# Patient Record
Sex: FEMALE | Race: WHITE | Employment: FULL TIME | URBAN - METROPOLITAN AREA
[De-identification: names, ages, dates, MRNs, and addresses within clinical notes are randomized per-mention and may not be internally consistent; named-entity substitution may affect disease eponyms.]

---

## 2017-02-07 ENCOUNTER — TELEPHONE (OUTPATIENT)
Dept: FAMILY MEDICINE | Facility: CLINIC | Age: 62
End: 2017-02-07

## 2017-11-20 ENCOUNTER — HOSPITAL ENCOUNTER (OUTPATIENT)
Dept: MAMMOGRAPHY | Facility: CLINIC | Age: 62
Discharge: HOME | End: 2017-11-20
Attending: OBSTETRICS & GYNECOLOGY | Admitting: OBSTETRICS & GYNECOLOGY
Payer: COMMERCIAL

## 2017-11-20 DIAGNOSIS — Z12.31 VISIT FOR SCREENING MAMMOGRAM: ICD-10-CM

## 2017-11-20 PROCEDURE — 77063 BREAST TOMOSYNTHESIS BI: CPT

## 2017-11-20 PROCEDURE — G0202 SCR MAMMO BI INCL CAD: HCPCS

## 2017-12-30 ENCOUNTER — OFFICE VISIT (OUTPATIENT)
Dept: URGENT CARE | Facility: URGENT CARE | Age: 62
End: 2017-12-30
Payer: COMMERCIAL

## 2017-12-30 VITALS
TEMPERATURE: 98.9 F | OXYGEN SATURATION: 98 % | SYSTOLIC BLOOD PRESSURE: 102 MMHG | HEART RATE: 69 BPM | WEIGHT: 145.6 LBS | BODY MASS INDEX: 23.15 KG/M2 | DIASTOLIC BLOOD PRESSURE: 80 MMHG

## 2017-12-30 DIAGNOSIS — J01.00 ACUTE NON-RECURRENT MAXILLARY SINUSITIS: Primary | ICD-10-CM

## 2017-12-30 LAB
DEPRECATED S PYO AG THROAT QL EIA: NORMAL
SPECIMEN SOURCE: NORMAL

## 2017-12-30 PROCEDURE — 99213 OFFICE O/P EST LOW 20 MIN: CPT | Performed by: STUDENT IN AN ORGANIZED HEALTH CARE EDUCATION/TRAINING PROGRAM

## 2017-12-30 PROCEDURE — 87880 STREP A ASSAY W/OPTIC: CPT | Performed by: FAMILY MEDICINE

## 2017-12-30 PROCEDURE — 87081 CULTURE SCREEN ONLY: CPT | Performed by: STUDENT IN AN ORGANIZED HEALTH CARE EDUCATION/TRAINING PROGRAM

## 2017-12-30 NOTE — MR AVS SNAPSHOT
After Visit Summary   12/30/2017    Ria Miranda    MRN: [id number]           Patient Information     Date Of Birth          1955        Visit Information        Provider Department      12/30/2017 2:10 PM Sam Carter PA-C [facility] [place]        Today's Diagnoses     Acute non-recurrent maxillary sinusitis    -  1       Follow-ups after your visit        Follow-up notes from your care team     Return for Recheck If symptoms worsen or fail to improve..      Who to contact     If you have questions or need follow up information about today's clinic visit or your schedule please contact [facility] directly at [phone].  Normal or non-critical lab and imaging results will be communicated to you by MyChart, letter or phone within 4 business days after the clinic has received the results. If you do not hear from us within 7 days, please contact the clinic through [employer]oc[employer] or phone. If you have a critical or abnormal lab result, we will notify you by phone as soon as possible.  Submit refill requests through [employer] or call your pharmacy and they will forward the refill request to us. Please allow 3 business days for your refill to be completed.          Additional Information About Your Visit        MyChart Information     [employer] gives you secure access to your electronic health record. If you see a primary care provider, you can also send messages to your care team and make appointments. If you have questions, please call your primary care clinic.  If you do not have a primary care provider, please call [phone] and they will assist you.        Care EveryWhere ID     This is your Care EveryWhere ID. This could be used by other organizations to access your [facility] medical records  [id number]        Your Vitals Were     Pulse Temperature Last Period Pulse Oximetry BMI (Body Mass Index)       69 98.9  F (37.2  C) (Oral) 09/09/2005 98% 23.15 kg/m2         Blood Pressure from Last 3 Encounters:   12/30/17 102/80   06/24/16 110/70   03/07/16 100/70    Weight from Last 3 Encounters:   12/30/17 145 lb 9.6 oz (66 kg)   06/24/16 148 lb (67.1 kg)   03/07/16 147 lb 2 oz (66.7 kg)              We Performed the Following     Beta strep group A culture     Rapid strep screen          Today's Medication Changes          These changes are accurate as of: 12/30/17  3:52 PM.  If you have any questions, ask your nurse or doctor.               Start taking these medicines.        Dose/Directions    amoxicillin-clavulanate 875-125 MG per tablet   Commonly known as:  AUGMENTIN   Used for:  Acute non-recurrent maxillary sinusitis   Started by:  Sam Carter PA-C        Dose:  1 tablet   Take 1 tablet by mouth 2 times daily   Quantity:  20 tablet   Refills:  0            Where to get your medicines      These medications were sent to [facility] [address] [place] [address] [address] [facility]  [address] [facility] [address]     Phone:  [phone]     amoxicillin-clavulanate 875-125 MG per tablet                Primary Care Provider Office Phone # Fax #    Emelia Hairston [phone]       [facility] [address] [facility] [address]  [facility] [address]        Equal Access to Services     [facility] AH: Hadii aad ku hadasho Soomaali, waaxda luqadaha, qaybta kaalmada adeegyada, waxnina costa hayashok ybarra. So [facility] [phone].    ATENCIÓN: Si habla español, tiene a brown disposición servicios gratuitos de asistencia lingüística. Alvaame al [phone].    We comply with applicable federal civil rights laws and [place] laws. We do not discriminate on the basis of race, color, national origin, age, disability, sex, sexual orientation, or gender identity.            Thank you!     Thank you for choosing [facility]  for your care. Our goal is always to provide you with excellent care. Hearing back from our patients is  one way we can continue to improve our services. Please take a few minutes to complete the written survey that you may receive in the mail after your visit with us. Thank you!             Your Updated Medication List - Protect others around you: Learn how to safely use, store and throw away your medicines at www.disposemymeds.org.          This list is accurate as of: 12/30/17  3:52 PM.  Always use your most recent med list.                   Brand Name Dispense Instructions for use Diagnosis    amoxicillin-clavulanate 875-125 MG per tablet    AUGMENTIN    20 tablet    Take 1 tablet by mouth 2 times daily    Acute non-recurrent maxillary sinusitis       CALCIUM 600 + D 600-200 MG-UNIT Tabs     3 MONTHS         fluticasone 50 MCG/ACT spray    FLONASE    3 Bottle    USE TWO SPRAY(S) IN EACH NOSTRIL ONCE DAILY    Chronic rhinitis       metoprolol 25 MG 24 hr tablet    TOPROL-XL    45 tablet    Take 0.5 tablets (12.5 mg) by mouth daily    Palpitations       MULTIVITAMIN ADULT PO           simvastatin 20 MG tablet    ZOCOR    90 tablet    Take 1 tablet (20 mg) by mouth At Bedtime    Pure hypercholesterolemia       traZODone 50 MG tablet    DESYREL    90 tablet    Take 1 tablet (50 mg) by mouth At Bedtime    Insomnia, unspecified

## 2017-12-30 NOTE — PROGRESS NOTES
SUBJECTIVE:   Ria Miranda is a 62 year old female who is here today with:  CC: possible sinus infection. The patient notes she also has been having sinus pressure, nasal discharge, post nasal drip, intermittent headache, nausea, and intermittent cough. The sinus pain and pressure are the greatest symptoms.   Serious symptoms include: none applicable.    Onset of symptoms was 3 weeks ago.   Course of illness is worsening.  none applicable exposures.   Treatment measures tried include Tylenol/Ibuprofen, Decongestants, Antihistamine and OTC Cough med.    All Medications, Allergies and Histories reviewed and current as of todays visit.    OBJECTIVE:   /80 (BP Location: Right arm, Patient Position: Sitting, Cuff Size: Adult Regular)  Pulse 69  Temp 98.9  F (37.2  C) (Oral)  Wt 145 lb 9.6 oz (66 kg)  LMP 09/09/2005  SpO2 98%  BMI 23.15 kg/m2    GENERAL: healthy, alert and no distress  EYES:Lids and Conjunctival normal  EAR: CANAL and Left TM is normal: no effusions, no erythema, and normal landmarks, CANAL and Right TM is normal: no effusions, no erythema, and normal landmarks.  NOSE: mucosal edema, purulent discharge and sinus tenderness on exam.  OROPHARYNX:normal, no tonsillar hypertrophy and no exudates present.  NECK: normal, supple and no adenopathy  LUNGS:normal and CTAB  HEART :regular rate and rhythm and no murmurs, clicks, or gallops  ABDOMEN:non-tender  SKIN:Warm, Dry and No Rash    Results for orders placed or performed in visit on 12/30/17   Rapid strep screen   Result Value Ref Range    Specimen Description Throat     Rapid Strep A Screen       NEGATIVE: No Group A streptococcal antigen detected by immunoassay, await culture report.         ASSESSMENT:   Ria was seen today for urgent care.    Diagnoses and all orders for this visit:    Acute non-recurrent maxillary sinusitis  -     Rapid strep screen  -     Beta strep group A culture  -     amoxicillin-clavulanate (AUGMENTIN) 875125  MG per tablet; Take 1 tablet by mouth 2 times daily      Presentation and physical exam is most consistent with sinusitis. Her symptoms have been ongoing for greater than 3 weeks and worsening. She is going to start Augmentin at this time. She will also continue Flonase and  Sudafed to help with symptoms. A warm humidifier will likely provide relief. Can trial a nasal saline rinse to help improve pain and pressure. She denies any concerns at this time. Educated on red-flags to return to the clinic.      Sam Carter PA-C

## 2017-12-30 NOTE — NURSING NOTE
"Chief Complaint   Patient presents with     Urgent Care     Sinus headache/pressure x 3 weeks (worse the last few days), sore throat x1 day. Patient has tried numerous OTC remedies but is not experiencing any relief.       Initial /80 (BP Location: Right arm, Patient Position: Sitting, Cuff Size: Adult Regular)  Pulse 69  Temp 98.9  F (37.2  C) (Oral)  Wt 145 lb 9.6 oz (66 kg)  LMP 09/09/2005  SpO2 98%  BMI 23.15 kg/m2 Estimated body mass index is 23.15 kg/(m^2) as calculated from the following:    Height as of 6/24/16: 5' 6.5\" (1.689 m).    Weight as of this encounter: 145 lb 9.6 oz (66 kg).  Medication Reconciliation: incomplete   Carolina Torres CMA (AAMA)            "

## 2017-12-31 LAB
BACTERIA SPEC CULT: NORMAL
SPECIMEN SOURCE: NORMAL

## 2018-05-17 ENCOUNTER — OFFICE VISIT (OUTPATIENT)
Dept: URGENT CARE | Facility: URGENT CARE | Age: 63
End: 2018-05-17
Payer: COMMERCIAL

## 2018-05-17 VITALS
HEART RATE: 72 BPM | TEMPERATURE: 98.7 F | OXYGEN SATURATION: 96 % | WEIGHT: 142 LBS | BODY MASS INDEX: 22.58 KG/M2 | SYSTOLIC BLOOD PRESSURE: 120 MMHG | DIASTOLIC BLOOD PRESSURE: 70 MMHG

## 2018-05-17 DIAGNOSIS — K52.9 CHRONIC DIARRHEA: ICD-10-CM

## 2018-05-17 DIAGNOSIS — R07.0 THROAT PAIN: ICD-10-CM

## 2018-05-17 DIAGNOSIS — J02.0 STREP THROAT: Primary | ICD-10-CM

## 2018-05-17 LAB
DEPRECATED S PYO AG THROAT QL EIA: ABNORMAL
SPECIMEN SOURCE: ABNORMAL

## 2018-05-17 PROCEDURE — 99213 OFFICE O/P EST LOW 20 MIN: CPT | Performed by: PHYSICIAN ASSISTANT

## 2018-05-17 PROCEDURE — 87880 STREP A ASSAY W/OPTIC: CPT | Performed by: FAMILY MEDICINE

## 2018-05-17 RX ORDER — AMOXICILLIN 500 MG/1
500 CAPSULE ORAL 2 TIMES DAILY
Qty: 20 CAPSULE | Refills: 0 | Status: SHIPPED | OUTPATIENT
Start: 2018-05-17 | End: 2018-05-27

## 2018-05-17 NOTE — PATIENT INSTRUCTIONS
Pharyngitis: Strep (Confirmed)    You have had a positive test for strep throat. Strep throat is a contagious illness. It is spread by coughing, kissing or by touching others after touching your mouth or nose. Symptoms include throat pain that is worse with swallowing, aching all over, headache, and fever. It is treated with antibiotic medicine. This should help you start to feel better in 1 to 2 days.  Home care    Rest at home. Drink plenty of fluids to you won't get dehydrated.    No work or school for the first 2 days of taking the antibiotics. After this time, you will not be contagious. You can then return to school or work if you are feeling better.     Take antibiotic medicine for the full 10 days, even if you feel better. This is very important to ensure the infection is treated. It is also important to prevent medicine-resistant germs from developing. If you were given an antibiotic shot, you don't need any more antibiotics.    You may use acetaminophen or ibuprofen to control pain or fever, unless another medicine was prescribed for this. Talk with your healthcare provider before taking these medicines if you have chronic liver or kidney disease. Also talk with your healthcare provider if you have had a stomach ulcer or GI bleeding.    Throat lozenges or sprays help reduce pain. Gargling with warm saltwater will also reduce throat pain. Dissolve 1/2 teaspoon of salt in 1 glass of warm water. This may be useful just before meals.     Soft foods are OK. Don't eat salty or spicy foods.  Follow-up care  Follow up with your healthcare provider or our staff if you don't get better over the next week.  When to seek medical advice  Call your healthcare provider right away if any of these occur:    Fever of 100.4 F (38 C) or higher, or as directed by your healthcare provider    New or worsening ear pain, sinus pain, or headache    Painful lumps in the back of neck    Stiff neck    Lymph nodes getting larger or  becoming soft in the middle    You can't swallow liquids or you can't open your mouth wide because of throat pain    Signs of dehydration. These include very dark urine or no urine, sunken eyes, and dizziness.    Trouble breathing or noisy breathing    Muffled voice    Rash  Prevention  Here are steps you can take to help prevent an infection:    Keep good hand washing habits.    Don t have close contact with people who have sore throats, colds, or other upper respiratory infections.    Don t smoke, and stay away from secondhand smoke.  Date Last Reviewed: 11/1/2017 2000-2017 [employer]. [address] [place] [address]. All rights reserved. This information is not intended as a substitute for professional medical care. Always follow your healthcare professional's instructions.

## 2018-05-17 NOTE — MR AVS SNAPSHOT
After Visit Summary   5/17/2018    Ria Miranda    MRN: [id number]           Patient Information     Date Of Birth          1955        Visit Information        Provider Department      5/17/2018 5:50 PM Fabian Neil PA-C [facility]        Today's Diagnoses     Strep throat    -  1    Throat pain          Care Instructions      Pharyngitis: Strep (Confirmed)    You have had a positive test for strep throat. Strep throat is a contagious illness. It is spread by coughing, kissing or by touching others after touching your mouth or nose. Symptoms include throat pain that is worse with swallowing, aching all over, headache, and fever. It is treated with antibiotic medicine. This should help you start to feel better in 1 to 2 days.  Home care    Rest at home. Drink plenty of fluids to you won't get dehydrated.    No work or school for the first 2 days of taking the antibiotics. After this time, you will not be contagious. You can then return to school or work if you are feeling better.     Take antibiotic medicine for the full 10 days, even if you feel better. This is very important to ensure the infection is treated. It is also important to prevent medicine-resistant germs from developing. If you were given an antibiotic shot, you don't need any more antibiotics.    You may use acetaminophen or ibuprofen to control pain or fever, unless another medicine was prescribed for this. Talk with your healthcare provider before taking these medicines if you have chronic liver or kidney disease. Also talk with your healthcare provider if you have had a stomach ulcer or GI bleeding.    Throat lozenges or sprays help reduce pain. Gargling with warm saltwater will also reduce throat pain. Dissolve 1/2 teaspoon of salt in 1 glass of warm water. This may be useful just before meals.     Soft foods are OK. Don't eat salty or spicy foods.  Follow-up care  Follow up with your  healthcare provider or our staff if you don't get better over the next week.  When to seek medical advice  Call your healthcare provider right away if any of these occur:    Fever of 100.4 F (38 C) or higher, or as directed by your healthcare provider    New or worsening ear pain, sinus pain, or headache    Painful lumps in the back of neck    Stiff neck    Lymph nodes getting larger or becoming soft in the middle    You can't swallow liquids or you can't open your mouth wide because of throat pain    Signs of dehydration. These include very dark urine or no urine, sunken eyes, and dizziness.    Trouble breathing or noisy breathing    Muffled voice    Rash  Prevention  Here are steps you can take to help prevent an infection:    Keep good hand washing habits.    Don t have close contact with people who have sore throats, colds, or other upper respiratory infections.    Don t smoke, and stay away from secondhand smoke.  Date Last Reviewed: 11/1/2017 2000-2017 [employer]. [address] [place] [address]. All rights reserved. This information is not intended as a substitute for professional medical care. Always follow your healthcare professional's instructions.                Follow-ups after your visit        Who to contact     If you have questions or need follow up information about today's clinic visit or your schedule please contact [facility] directly at [phone].  Normal or non-critical lab and imaging results will be communicated to you by MyChart, letter or phone within 4 business days after the clinic has received the results. If you do not hear from us within 7 days, please contact the clinic through MyChart or phone. If you have a critical or abnormal lab result, we will notify you by phone as soon as possible.  Submit refill requests through [employer] or call your pharmacy and they will forward the refill request to us. Please allow 3 business days for your  refill to be completed.          Additional Information About Your Visit        Co-Workhart Information     [employer] gives you secure access to your electronic health record. If you see a primary care provider, you can also send messages to your care team and make appointments. If you have questions, please call your primary care clinic.  If you do not have a primary care provider, please call [phone] and they will assist you.        Care EveryWhere ID     This is your Care EveryWhere ID. This could be used by other organizations to access your [facility] medical records  [id number]        Your Vitals Were     Pulse Temperature Last Period Pulse Oximetry BMI (Body Mass Index)       72 98.7  F (37.1  C) (Tympanic) 09/09/2005 96% 22.58 kg/m2        Blood Pressure from Last 3 Encounters:   05/17/18 120/70   12/30/17 102/80   06/24/16 110/70    Weight from Last 3 Encounters:   05/17/18 142 lb (64.4 kg)   12/30/17 145 lb 9.6 oz (66 kg)   06/24/16 148 lb (67.1 kg)              We Performed the Following     Rapid strep screen          Today's Medication Changes          These changes are accurate as of 5/17/18  6:26 PM.  If you have any questions, ask your nurse or doctor.               Start taking these medicines.        Dose/Directions    amoxicillin 500 MG capsule   Commonly known as:  AMOXIL   Used for:  Strep throat        Dose:  500 mg   Take 1 capsule (500 mg) by mouth 2 times daily for 10 days   Quantity:  20 capsule   Refills:  0         Stop taking these medicines if you haven't already. Please contact your care team if you have questions.     amoxicillin-clavulanate 875-125 MG per tablet   Commonly known as:  AUGMENTIN                Where to get your medicines      These medications were sent to [facility] [address] [facility] [place] [facility] [address] [facility] [address] [facility] [address] [facility] [address]  [address] [facility] [address]     Phone:  [phone]     amoxicillin 500 MG capsule                Primary Care Provider Office Phone # Fax #     Emelia Hairston [phone]       [facility] [address]  [facility] [address]        Equal Access to Services     ROSA GUERRERO : Hadmartin saeed martinez denzel Bach, waaxda luqadaha, qaybta kaalmada ademarjorieda, amalia levy dustinvincent wilson laNataliaashok . So [facility] [phone].    ATENCIÓN: Si habla español, tiene a brown disposición servicios gratuitos de asistencia lingüística. Llame al [phone].    We comply with applicable federal civil rights laws and [place] laws. We do not discriminate on the basis of race, color, national origin, age, disability, sex, sexual orientation, or gender identity.            Thank you!     Thank you for choosing [facility]  for your care. Our goal is always to provide you with excellent care. Hearing back from our patients is one way we can continue to improve our services. Please take a few minutes to complete the written survey that you may receive in the mail after your visit with us. Thank you!             Your Updated Medication List - Protect others around you: Learn how to safely use, store and throw away your medicines at www.disposemymeds.org.          This list is accurate as of 5/17/18  6:26 PM.  Always use your most recent med list.                   Brand Name Dispense Instructions for use Diagnosis    amoxicillin 500 MG capsule    AMOXIL    20 capsule    Take 1 capsule (500 mg) by mouth 2 times daily for 10 days    Strep throat       CALCIUM 600 + D 600-200 MG-UNIT Tabs     3 MONTHS         fluticasone 50 MCG/ACT spray    FLONASE    3 Bottle    USE TWO SPRAY(S) IN EACH NOSTRIL ONCE DAILY    Chronic rhinitis       metoprolol succinate 25 MG 24 hr tablet    TOPROL-XL    45 tablet    Take 0.5 tablets (12.5 mg) by mouth daily    Palpitations       MULTIVITAMIN ADULT PO           simvastatin 20 MG tablet    ZOCOR    90 tablet    Take 1 tablet (20 mg) by mouth At Bedtime    Pure hypercholesterolemia       traZODone 50 MG tablet     DESYREL    90 tablet    Take 1 tablet (50 mg) by mouth At Bedtime    Insomnia, unspecified

## 2018-05-17 NOTE — PROGRESS NOTES
SUBJECTIVE:    Ria Miranda presents to  today for evaluation of ST x 5 days duration.  Positive subjective fever waxing and waning. Positive generalized HA waxing and waning.       Still able to take in PO fluids and solids despite c/o ST.      ROS:     HEENT: Positive ST as per above congestion. No other ENT sxs.   RESP: No  Cough, wheezing or SOB  GI: Positive for waxing and waning diarrhea since February. Admits diarrhea started approximately 2 weeks after Augmentin abx for sinusitis. Patient has not wanted to be worked up for this yet. She is taking OTC probiotics. When I offered/adivsed starting her work-up today, she declined my offer. Denies any hematochezia or mucous stools. Denies any N/V. No abdominal pain. Normal BM's  SKIN: Denies rash  NEURO: Positive subjective fever as noted above. Positive mild, waxing and waning generalized HA as per above. Denies any severe headaches, neck stiffness, photophobia, rash, mental status changes or lethargy.   URINARY: Reports good PO fluid intake and normal UOP.      Past Medical History:   Diagnosis Date     Irritable bowel syndrome      Current Outpatient Prescriptions   Medication     CALCIUM 600 + D 600-200 MG-UNIT OR TABS     fluticasone (FLONASE) 50 MCG/ACT nasal spray     metoprolol (TOPROL-XL) 25 MG 24 hr tablet     Multiple Vitamins-Minerals (MULTIVITAMIN ADULT PO)     simvastatin (ZOCOR) 20 MG tablet     traZODone (DESYREL) 50 MG tablet     No current facility-administered medications for this visit.      Allergies   Allergen Reactions     No Known Drug Allergy        OBJECTIVE:  /70 (BP Location: Right arm)  Pulse 72  Temp 98.7  F (37.1  C) (Tympanic)  Wt 142 lb (64.4 kg)  LMP 09/09/2005  SpO2 96%  BMI 22.58 kg/m2        General appearance: alert and no apparent distress  Skin color is pink and without rash.  HEENT:   Conjunctiva not injected.  Sclera clear.  Left TM is normal: no effusions, no erythema, and normal landmarks.  Right TM  "is normal: no effusions, no erythema, and normal landmarks.  Nasal mucosa is normal.  Oropharyngeal exam is positive for mild erythema.  No lesions, adenopathy, plaque or exudate.  Neck is supple, FROM. No neck stiffness. No adenopathy  CARDIAC:NORMAL - regular rate and rhythm without murmur.  RESP: Normal - CTA without rales, rhonchi, or wheezing.  ABDOMEN:  Soft, non-tender, non-distended.  Positive normal bowel sounds.  No HSM or masses.  No suprapubic tenderness.  No CVA tenderness.  NEURO: Alert and oriented.  Normal speech and mentation.  CN II/XII grossly intact.  Gait within normal limits.        LAB:     Component      Latest Ref Rng & Units 5/17/2018   Specimen Description       Throat   Rapid Strep A Screen       POSITIVE: Group A Streptococcal antigen detected by immunoassay. (A)         ASSESSMENT/PLAN:     (J02.0) Strep throat  (primary encounter diagnosis)  Comment: Patient also gives hx of chronic diarrhea. I advised her this is suspicious for C. Diff and I offered work-up now but she declined/states she prefers to follow-up with PCP. Patient is educated abx for Strep also may worsen sxs. Discussed risks and benefits of abx vs untreated Strep. Patient elected to start Abx now for Strep and will follow-up to discuss this further with PCP.   Plan: amoxicillin (AMOXIL) 500 MG capsule    1. Abx as per above   2. Ibuprofen or Tylenol prn pain   3. Follow up with PCP or UC if sxs change, worsen or fail to resolve with above tx.  4.  In addition to the above, Strep Pharyngitis \"red flag\" signs and sxs are reviewed with pt both verbally and by way of printed educational material for home review.  Pt verbalizes understanding of and agrees to the above plan.         (R07.0) Throat pain  Plan: Rapid strep screen  As per above     (K52.9) Chronic diarrhea  Plan: as per above       "

## 2019-12-08 ENCOUNTER — HEALTH MAINTENANCE LETTER (OUTPATIENT)
Age: 64
End: 2019-12-08

## 2020-03-15 ENCOUNTER — HEALTH MAINTENANCE LETTER (OUTPATIENT)
Age: 65
End: 2020-03-15